# Patient Record
(demographics unavailable — no encounter records)

---

## 2017-02-06 NOTE — DIAGNOSTIC IMAGING REPORT
KUB



CLINICAL HISTORY: N40.1 Benign prostatic hypertrophy with urinary

[id number]    



COMPARISON STUDY:  2/8/2016 



FINDINGS: The renal shadows are partially obscured by overlying bowel gas and

fecal material. No renal calculi are visualized the current study. There is no

pathologic bowel dilatation. A central pelvic calcification remains stable. This

may represent a sacral bone island or phlebolith. Degenerative changes are

present within the spine.



IMPRESSION:  

1. No evidence of pathologic bowel dilatation

2. No urinary tract calculi identified although the renal shadows are partially

obscured 







Electronically signed by:  Cale Au M.D.

2/6/2017 3:54 PM



Dictated Date/Time:  2/6/2017 3:52 PM